# Patient Record
(demographics unavailable — no encounter records)

---

## 2025-02-25 NOTE — PHYSICAL EXAM
[2+] : 2+ [Clear to Auscultation] : lungs were clear to auscultation bilaterally [Normal Gait and Station] : normal gait and station [Normal muscle strength, symmetry and tone of facial, head and neck musculature] : normal muscle strength, symmetry and tone of facial, head and neck musculature [Normal] : no cervical lymphadenopathy [Exposed Vessel] : left anterior vessel not exposed [Wheezing] : no wheezing [Increased Work of Breathing] : no increased work of breathing with use of accessory muscles and retractions [de-identified] : fearful, crying, combative

## 2025-02-25 NOTE — HISTORY OF PRESENT ILLNESS
[de-identified] : 2-25-25 15 month M with recurrent croup, last episode 2 days ago Siblings in school 5 episodes since October Needed steroids on 3 occasions and responded well No need for ED visit or admissions Seen by Dr. Zavala, ENT who recommended omeprazole 20mg (mom did not start per PMD) due to croup worsened by reflux Mom notes h/o reflux with need of famotidine as infant along with CMPA Currently drinking whole milk History of "grunting" in infancy, stopped around 6 months Dr. Zavala did not think it was tracheomalacia but no imaging done Scope by Dr. Zavala - advised normal  +Nasal congestion No prior nasal steroid use No snoring  1 recent ear infections No otorrhea Passed [facility] No speech or hearing concern No recent throat infections No bleeding or anesthesia issues H/o neuro eval for fall and finger surgery d/t trauma  Also with aplasia cutis, has not had imaging but has plan to see dermatology had finger surgery and possibly intubated, no FB history.

## 2025-02-25 NOTE — ASSESSMENT
[FreeTextEntry1] : 15 month M with recurrent croup well managed by oral steroids. History of noisy breathing in infancy. Will plan for airway films. 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 ?intubation for finger surgery.   Discussed role of airway endoscopy in these settings. Sometimes based on concern we just do to eval for any airway narrowing or FBs.  This history seems typical at this age for recurrent viral URIs with croup like symptoms.  Await Xrays to eval for any concerns for airway narrowing, PNA, or FBs. If any atypical features develop (prolonged course, stridor between episodes, croup like cough and stridor without concurrent URI, not responding to steroids, need for admission and/or intubation) then would definitely recommend DLB.  Also with history of nasal congestion and CMPA. Will plan trial of non-dairy milk such as Ripple to see if improvement noted.   RTC 6-8 weeks